# Patient Record
Sex: FEMALE | Race: WHITE | NOT HISPANIC OR LATINO | Employment: FULL TIME | URBAN - METROPOLITAN AREA
[De-identification: names, ages, dates, MRNs, and addresses within clinical notes are randomized per-mention and may not be internally consistent; named-entity substitution may affect disease eponyms.]

---

## 2017-01-26 ENCOUNTER — ANESTHESIA EVENT (OUTPATIENT)
Dept: SURGERY | Facility: CLINIC | Age: 37
End: 2017-01-26
Payer: COMMERCIAL

## 2017-01-27 ENCOUNTER — HOSPITAL ENCOUNTER (OUTPATIENT)
Facility: CLINIC | Age: 37
Discharge: HOME | End: 2017-01-27
Attending: OBSTETRICS & GYNECOLOGY | Admitting: OBSTETRICS & GYNECOLOGY
Payer: COMMERCIAL

## 2017-01-27 ENCOUNTER — ANESTHESIA (OUTPATIENT)
Dept: SURGERY | Facility: CLINIC | Age: 37
End: 2017-01-27
Payer: COMMERCIAL

## 2017-01-27 VITALS
BODY MASS INDEX: 31.02 KG/M2 | WEIGHT: 181.7 LBS | TEMPERATURE: 97.7 F | RESPIRATION RATE: 14 BRPM | OXYGEN SATURATION: 100 % | SYSTOLIC BLOOD PRESSURE: 112 MMHG | HEIGHT: 64 IN | DIASTOLIC BLOOD PRESSURE: 82 MMHG

## 2017-01-27 DIAGNOSIS — N84.0 ENDOMETRIAL POLYP: Primary | ICD-10-CM

## 2017-01-27 LAB — HCG SERPL QL: NEGATIVE

## 2017-01-27 PROCEDURE — 88305 TISSUE EXAM BY PATHOLOGIST: CPT | Performed by: OBSTETRICS & GYNECOLOGY

## 2017-01-27 PROCEDURE — 71000027 ZZH RECOVERY PHASE 2 EACH 15 MINS: Performed by: OBSTETRICS & GYNECOLOGY

## 2017-01-27 PROCEDURE — 25000125 ZZHC RX 250: Performed by: ANESTHESIOLOGY

## 2017-01-27 PROCEDURE — 40000170 ZZH STATISTIC PRE-PROCEDURE ASSESSMENT II: Performed by: OBSTETRICS & GYNECOLOGY

## 2017-01-27 PROCEDURE — 25800025 ZZH RX 258: Performed by: NURSE ANESTHETIST, CERTIFIED REGISTERED

## 2017-01-27 PROCEDURE — 36415 COLL VENOUS BLD VENIPUNCTURE: CPT | Performed by: OBSTETRICS & GYNECOLOGY

## 2017-01-27 PROCEDURE — 37000008 ZZH ANESTHESIA TECHNICAL FEE, 1ST 30 MIN: Performed by: OBSTETRICS & GYNECOLOGY

## 2017-01-27 PROCEDURE — 88305 TISSUE EXAM BY PATHOLOGIST: CPT | Mod: 26 | Performed by: OBSTETRICS & GYNECOLOGY

## 2017-01-27 PROCEDURE — 84703 CHORIONIC GONADOTROPIN ASSAY: CPT | Performed by: OBSTETRICS & GYNECOLOGY

## 2017-01-27 PROCEDURE — 71000012 ZZH RECOVERY PHASE 1 LEVEL 1 FIRST HR: Performed by: OBSTETRICS & GYNECOLOGY

## 2017-01-27 PROCEDURE — 36000058 ZZH SURGERY LEVEL 3 EA 15 ADDTL MIN: Performed by: OBSTETRICS & GYNECOLOGY

## 2017-01-27 PROCEDURE — 37000009 ZZH ANESTHESIA TECHNICAL FEE, EACH ADDTL 15 MIN: Performed by: OBSTETRICS & GYNECOLOGY

## 2017-01-27 PROCEDURE — 25000125 ZZHC RX 250: Performed by: OBSTETRICS & GYNECOLOGY

## 2017-01-27 PROCEDURE — 25000125 ZZHC RX 250: Performed by: NURSE ANESTHETIST, CERTIFIED REGISTERED

## 2017-01-27 PROCEDURE — 71000013 ZZH RECOVERY PHASE 1 LEVEL 1 EA ADDTL HR: Performed by: OBSTETRICS & GYNECOLOGY

## 2017-01-27 PROCEDURE — 36000056 ZZH SURGERY LEVEL 3 1ST 30 MIN: Performed by: OBSTETRICS & GYNECOLOGY

## 2017-01-27 PROCEDURE — 27210794 ZZH OR GENERAL SUPPLY STERILE: Performed by: OBSTETRICS & GYNECOLOGY

## 2017-01-27 RX ORDER — CEFAZOLIN SODIUM 2 G/100ML
2 INJECTION, SOLUTION INTRAVENOUS
Status: COMPLETED | OUTPATIENT
Start: 2017-01-27 | End: 2017-01-27

## 2017-01-27 RX ORDER — OXYCODONE HYDROCHLORIDE 5 MG/1
5-10 TABLET ORAL
Qty: 15 TABLET | Refills: 0 | Status: SHIPPED | OUTPATIENT
Start: 2017-01-27

## 2017-01-27 RX ORDER — ONDANSETRON 2 MG/ML
4 INJECTION INTRAMUSCULAR; INTRAVENOUS EVERY 30 MIN PRN
Status: DISCONTINUED | OUTPATIENT
Start: 2017-01-27 | End: 2017-01-27 | Stop reason: HOSPADM

## 2017-01-27 RX ORDER — KETOROLAC TROMETHAMINE 30 MG/ML
30 INJECTION, SOLUTION INTRAMUSCULAR; INTRAVENOUS EVERY 6 HOURS PRN
Status: DISCONTINUED | OUTPATIENT
Start: 2017-01-27 | End: 2017-01-27 | Stop reason: HOSPADM

## 2017-01-27 RX ORDER — CEFAZOLIN SODIUM 1 G/3ML
1 INJECTION, POWDER, FOR SOLUTION INTRAMUSCULAR; INTRAVENOUS SEE ADMIN INSTRUCTIONS
Status: DISCONTINUED | OUTPATIENT
Start: 2017-01-27 | End: 2017-01-27 | Stop reason: HOSPADM

## 2017-01-27 RX ORDER — SODIUM CHLORIDE, SODIUM LACTATE, POTASSIUM CHLORIDE, CALCIUM CHLORIDE 600; 310; 30; 20 MG/100ML; MG/100ML; MG/100ML; MG/100ML
1000 INJECTION, SOLUTION INTRAVENOUS CONTINUOUS
Status: DISCONTINUED | OUTPATIENT
Start: 2017-01-27 | End: 2017-01-27 | Stop reason: HOSPADM

## 2017-01-27 RX ORDER — PHYSOSTIGMINE SALICYLATE 1 MG/ML
1.2 INJECTION INTRAVENOUS
Status: DISCONTINUED | OUTPATIENT
Start: 2017-01-27 | End: 2017-01-27 | Stop reason: HOSPADM

## 2017-01-27 RX ORDER — ONDANSETRON 4 MG/1
4 TABLET, ORALLY DISINTEGRATING ORAL EVERY 30 MIN PRN
Status: DISCONTINUED | OUTPATIENT
Start: 2017-01-27 | End: 2017-01-27 | Stop reason: HOSPADM

## 2017-01-27 RX ORDER — IBUPROFEN 600 MG/1
600 TABLET, FILM COATED ORAL
Status: DISCONTINUED | OUTPATIENT
Start: 2017-01-27 | End: 2017-01-27 | Stop reason: HOSPADM

## 2017-01-27 RX ORDER — FENTANYL CITRATE 50 UG/ML
25-50 INJECTION, SOLUTION INTRAMUSCULAR; INTRAVENOUS
Status: DISCONTINUED | OUTPATIENT
Start: 2017-01-27 | End: 2017-01-27 | Stop reason: HOSPADM

## 2017-01-27 RX ORDER — SODIUM CHLORIDE, SODIUM LACTATE, POTASSIUM CHLORIDE, CALCIUM CHLORIDE 600; 310; 30; 20 MG/100ML; MG/100ML; MG/100ML; MG/100ML
INJECTION, SOLUTION INTRAVENOUS CONTINUOUS PRN
Status: DISCONTINUED | OUTPATIENT
Start: 2017-01-27 | End: 2017-01-27

## 2017-01-27 RX ORDER — LIDOCAINE HYDROCHLORIDE 20 MG/ML
INJECTION, SOLUTION INFILTRATION; PERINEURAL PRN
Status: DISCONTINUED | OUTPATIENT
Start: 2017-01-27 | End: 2017-01-27

## 2017-01-27 RX ORDER — PROPOFOL 10 MG/ML
INJECTION, EMULSION INTRAVENOUS CONTINUOUS PRN
Status: DISCONTINUED | OUTPATIENT
Start: 2017-01-27 | End: 2017-01-27

## 2017-01-27 RX ORDER — EPINEPHRINE 0.15 MG/.3ML
0.15 INJECTION INTRAMUSCULAR PRN
COMMUNITY

## 2017-01-27 RX ORDER — DEXAMETHASONE SODIUM PHOSPHATE 4 MG/ML
INJECTION, SOLUTION INTRA-ARTICULAR; INTRALESIONAL; INTRAMUSCULAR; INTRAVENOUS; SOFT TISSUE PRN
Status: DISCONTINUED | OUTPATIENT
Start: 2017-01-27 | End: 2017-01-27

## 2017-01-27 RX ORDER — FENTANYL CITRATE 50 UG/ML
25-50 INJECTION, SOLUTION INTRAMUSCULAR; INTRAVENOUS EVERY 5 MIN PRN
Status: DISCONTINUED | OUTPATIENT
Start: 2017-01-27 | End: 2017-01-27 | Stop reason: HOSPADM

## 2017-01-27 RX ORDER — NALOXONE HYDROCHLORIDE 0.4 MG/ML
.1-.4 INJECTION, SOLUTION INTRAMUSCULAR; INTRAVENOUS; SUBCUTANEOUS
Status: DISCONTINUED | OUTPATIENT
Start: 2017-01-27 | End: 2017-01-27 | Stop reason: HOSPADM

## 2017-01-27 RX ORDER — MEPERIDINE HYDROCHLORIDE 25 MG/ML
12.5 INJECTION INTRAMUSCULAR; INTRAVENOUS; SUBCUTANEOUS
Status: DISCONTINUED | OUTPATIENT
Start: 2017-01-27 | End: 2017-01-27 | Stop reason: HOSPADM

## 2017-01-27 RX ORDER — ONDANSETRON 2 MG/ML
INJECTION INTRAMUSCULAR; INTRAVENOUS PRN
Status: DISCONTINUED | OUTPATIENT
Start: 2017-01-27 | End: 2017-01-27

## 2017-01-27 RX ORDER — HYDROMORPHONE HYDROCHLORIDE 1 MG/ML
.3-.5 INJECTION, SOLUTION INTRAMUSCULAR; INTRAVENOUS; SUBCUTANEOUS EVERY 10 MIN PRN
Status: DISCONTINUED | OUTPATIENT
Start: 2017-01-27 | End: 2017-01-27 | Stop reason: HOSPADM

## 2017-01-27 RX ORDER — OXYCODONE HYDROCHLORIDE 5 MG/1
5-10 TABLET ORAL
Status: DISCONTINUED | OUTPATIENT
Start: 2017-01-27 | End: 2017-01-27 | Stop reason: HOSPADM

## 2017-01-27 RX ORDER — FENTANYL CITRATE 50 UG/ML
INJECTION, SOLUTION INTRAMUSCULAR; INTRAVENOUS PRN
Status: DISCONTINUED | OUTPATIENT
Start: 2017-01-27 | End: 2017-01-27

## 2017-01-27 RX ORDER — PROPOFOL 10 MG/ML
INJECTION, EMULSION INTRAVENOUS PRN
Status: DISCONTINUED | OUTPATIENT
Start: 2017-01-27 | End: 2017-01-27

## 2017-01-27 RX ORDER — HYDRALAZINE HYDROCHLORIDE 20 MG/ML
2.5-5 INJECTION INTRAMUSCULAR; INTRAVENOUS EVERY 10 MIN PRN
Status: DISCONTINUED | OUTPATIENT
Start: 2017-01-27 | End: 2017-01-27 | Stop reason: HOSPADM

## 2017-01-27 RX ADMIN — ONDANSETRON 4 MG: 2 INJECTION INTRAMUSCULAR; INTRAVENOUS at 07:50

## 2017-01-27 RX ADMIN — LIDOCAINE HYDROCHLORIDE 60 MG: 20 INJECTION, SOLUTION INFILTRATION; PERINEURAL at 07:29

## 2017-01-27 RX ADMIN — DEXAMETHASONE SODIUM PHOSPHATE 4 MG: 4 INJECTION, SOLUTION INTRAMUSCULAR; INTRAVENOUS at 07:38

## 2017-01-27 RX ADMIN — SODIUM CHLORIDE, POTASSIUM CHLORIDE, SODIUM LACTATE AND CALCIUM CHLORIDE: 600; 310; 30; 20 INJECTION, SOLUTION INTRAVENOUS at 07:27

## 2017-01-27 RX ADMIN — PROPOFOL 175 MCG/KG/MIN: 10 INJECTION, EMULSION INTRAVENOUS at 07:30

## 2017-01-27 RX ADMIN — CEFAZOLIN SODIUM 2 G: 2 INJECTION, SOLUTION INTRAVENOUS at 07:32

## 2017-01-27 RX ADMIN — PROPOFOL 200 MG: 10 INJECTION, EMULSION INTRAVENOUS at 07:29

## 2017-01-27 RX ADMIN — MIDAZOLAM HYDROCHLORIDE 2 MG: 1 INJECTION, SOLUTION INTRAMUSCULAR; INTRAVENOUS at 07:29

## 2017-01-27 RX ADMIN — KETOROLAC TROMETHAMINE 30 MG: 30 INJECTION, SOLUTION INTRAMUSCULAR at 08:30

## 2017-01-27 RX ADMIN — FENTANYL CITRATE 50 MCG: 50 INJECTION, SOLUTION INTRAMUSCULAR; INTRAVENOUS at 07:29

## 2017-01-27 NOTE — ANESTHESIA CARE TRANSFER NOTE
Patient: Calista Nelson    COMBINED DILATION AND CURETTAGE, OPERATIVE HYSTEROSCOPY WITH MORCELLATOR (N/A Vagina)  Additional InformationProcedure(s):  ENDOMETERIAL POLYPS, RESECTION OF: HYSTEROSCOPY,  VISUAL CURRETAGE - Wound Class: II-Clean Contaminated    Diagnosis: ENDOMETRIAL POLYP   Diagnosis Additional Information: No value filed.    Anesthesia Type:   General, LMA     Note:  Airway :Face Mask  Patient transferred to:PACU  Comments: Patient with good spontaneous respirations, tidal volumes 300-500cc. Patient awake, follows commands, LMA removed. Dentition unchanged from pReop IV patent.. Patient transported to recovery with oxygen. Report given to RN, care transferred, questions answered. Patient glasses handed off to Julisa PACU nurse.  Vitals in PACU:  /65  HR 85  RR 16  Sats 100%  Temp 97.3      Vitals: (Last set prior to Anesthesia Care Transfer)              Electronically Signed By: MERT Frazier CRNA  January 27, 2017  8:05 AM

## 2017-01-27 NOTE — IP AVS SNAPSHOT
[facility] Same Day Surgery    [address]    [place] [address]    Phone:  [phone]    Fax:  [phone]                                       After Visit Summary   1/27/2017    Calista Nelson    MRN: [id number]           After Visit Summary Signature Page     I have received my discharge instructions, and my questions have been answered. I have discussed any challenges I see with this plan with the nurse or doctor.    ..........................................................................................................................................  Patient/Patient Representative Signature      ..........................................................................................................................................  Patient Representative Print Name and Relationship to Patient    ..................................................               ................................................  Date                                            Time    ..........................................................................................................................................  Reviewed by Signature/Title    ...................................................              ..............................................  Date                                                            Time

## 2017-01-27 NOTE — IP AVS SNAPSHOT
MRN:[id number]                      After Visit Summary   1/27/2017    Calista Nelson    MRN: [id number]           Thank you!     Thank you for choosing [facility] for your care. Our goal is always to provide you with excellent care. Hearing back from our patients is one way we can continue to improve our services. Please take a few minutes to complete the written survey that you may receive in the mail after you visit with us. Thank you!        Patient Information     Date Of Birth          1980        About your hospital stay     You were admitted on:  January 27, 2017 You last received care in the:  [facility] Same Day Surgery    You were discharged on:  January 27, 2017       Who to Call     For medical emergencies, please call 911.  For non-urgent questions about your medical care, please call your primary care provider or clinic, [phone]  For questions related to your surgery, please call your surgery clinic        Attending Provider     Provider    Alexandria Jefferson MD       Primary Care Provider Office Phone # Fax #    Ignacio Wen [phone]       [facility] [address]  [address]  [facility] [address]        After Care Instructions     Discharge Instructions       Patient to arrange follow up appointment in 2  weeks            Discharge Instructions       Pelvic Rest. No tampons, douching or intercourse for  1  weeks.                  Further instructions from your care team       Same Day Surgery Discharge Instructions for  Sedation and General Anesthesia       It's not unusual to feel dizzy, light-headed or faint for up to 24 hours after surgery or while taking pain medication.  If you have these symptoms: sit for a few minutes before standing and have someone assist you when you get up to walk or use the bathroom.      You should rest and relax for the next 24 hours. We recommend you make arrangements to have an adult stay with you for  at least 24 hours after your discharge.  Avoid hazardous and strenuous activity.      DO NOT DRIVE any vehicle or operate mechanical equipment for 24 hours following the end of your surgery.  Even though you may feel normal, your reactions may be affected by the medication you have received.      Do not drink alcoholic beverages for 24 hours following surgery.       Slowly progress to your regular diet as you feel able. It's not unusual to feel nauseated and/or vomit after receiving anesthesia.  If you develop these symptoms, drink clear liquids (apple juice, ginger ale, broth, 7-up, etc. ) until you feel better.  If your nausea and vomiting persists for 24 hours, please notify your surgeon.        All narcotic pain medications, along with inactivity and anesthesia, can cause constipation. Drinking plenty of liquids and increasing fiber intake will help.      For any questions of a medical nature, call your surgeon.      Do not make important decisions for 24 hours.      If you had general anesthesia, you may have a sore throat for a couple of days related to the breathing tube used during surgery.  You may use Cepacol lozenges to help with this discomfort.  If it worsens or if you develop a fever, contact your surgeon.       If you feel your pain is not well managed with the pain medications prescribed by your surgeon, please contact your surgeon's office to let them know so they can address your concerns.     [facility]  D&C   Discharge Instructions    ACTIVITY:  You may restart normal activities as your abdominal discomfort disappears.  It is certainly permissible to climb stairs, shower, and do ordinary, quiet activities.  More vigorous activities such as sports, intercourse and work may be resumed in 1 week.    OFFICE VISIT:  Please call a day or two after your surgery to make an appointment in approximately 2 weeks to discuss the results of your surgery and have your check-up.    VAGINAL  DISCHARGE:  You may have some bloody vaginal discharge for as long as one week.  Ordinary tampons may be used after 3-4 days.  Avoid super absorbent tampons.    TEMPERATURE:  If you develop a temperature elevation of 101 F or higher, please call our office immediately.    RESTRICTIONS:  Due to the effects of general anesthesia, please do not drive a car, drink alcoholic beverages, nor operate complex machinery in the first 24 hours following surgery.    PLEASE FEEL FREE TO CALL OUR OFFICE IF ANY QUESTIONS OR PROBLEMS ARISE.    [facility]    Nirmal Goins M.D.  Kishan Moya M.D.   Kareem Briggs M.D.  JANEL Sam M.D.   Alexandria Jefferson M.D.  Danay Jean M.D.  DEIDRA Yo M.D. Kimberly Naruko-Stewart, M.D.  _________________________________________________________________________________                   [facility]              [address] [place] [address]  [address]            [facility] [address]  [place] [address]            [phone] (Telephone)                                               [phone] (Telephone)            [phone] (Fax)  [phone] (Fax)            [facility]    **If you have questions or concerns about your procedure,   Call Dr. Jefferson at [phone]**      While you were at the hospital today you received Toradol, an antiinflammatory medication similar to Ibuprofen.  You should not take other antiinflammatory medication, such as Ibuprofen, Motrin, Advil, Aleve, Naprosyn, etc, until 2:30PM.             Pending Results     No orders found from 1/26/2017 to 1/28/2017.            Admission Information        Provider Department Dept Phone    1/27/2017 Alexandria Jefferson MD [facility] [phone]      Your Vitals Were     Blood Pressure  "Temperature Respirations    115/73 mmHg 97.3  F (36.3  C) (Temporal) 14    Height Weight BMI (Body Mass Index)    1.626 m (5' 4\") 82.419 kg (181 lb 11.2 oz) 31.17 kg/m2    Pulse Oximetry Last Period       100% 2017       [facility] Information     [facility] lets you send messages to your doctor, view your test results, renew your prescriptions, schedule appointments and more. To sign up, go to [email or web address] . Click on \"Log in\" on the left side of the screen, which will take you to the Welcome page. Then click on \"Sign up Now\" on the right side of the page.     You will be asked to enter the access code listed below, as well as some personal information. Please follow the directions to create your username and password.     Your access code is: [email or web address]  Expires: 2017  8:14 AM     Your access code will  in 90 days. If you need help or a new code, please call your [facility] clinic or [phone].        Care EveryWhere ID     This is your Care EveryWhere ID. This could be used by other organizations to access your [facility] medical records  [id number]           Review of your medicines      START taking        Dose / Directions    oxyCODONE 5 MG IR tablet   Commonly known as:  ROXICODONE   Used for:  Endometrial polyp        Dose:  5-10 mg   Take 1-2 tablets (5-10 mg) by mouth every 3 hours as needed for pain or other (Moderate to Severe)   Quantity:  15 tablet   Refills:  0         CONTINUE these medicines which have NOT CHANGED        Dose / Directions    EPINEPHrine 0.15 MG/0.3ML injection   Commonly known as:  EPIPEN JR        Dose:  0.15 mg   Inject 0.15 mg into the muscle as needed for anaphylaxis   Refills:  0       KLONOPIN PO        Dose:  1 mg   Take 1 mg by mouth daily   Refills:  0            Where to get your medicines      Some of these will need a paper prescription and others can be bought over the counter. Ask your nurse if you have questions.     Bring a paper prescription " for each of these medications    - oxyCODONE 5 MG IR tablet             Protect others around you: Learn how to safely use, store and throw away your medicines at www.disposemymeds.org.             Medication List: This is a list of all your medications and when to take them. Check marks below indicate your daily home schedule. Keep this list as a reference.      Medications           Morning Afternoon Evening Bedtime As Needed    EPINEPHrine 0.15 MG/0.3ML injection   Commonly known as:  EPIPEN JR   Inject 0.15 mg into the muscle as needed for anaphylaxis                                KLONOPIN PO   Take 1 mg by mouth daily                                oxyCODONE 5 MG IR tablet   Commonly known as:  ROXICODONE   Take 1-2 tablets (5-10 mg) by mouth every 3 hours as needed for pain or other (Moderate to Severe)

## 2017-01-27 NOTE — ANESTHESIA PREPROCEDURE EVALUATION
Anesthesia Evaluation     . Pt has had prior anesthetic.       ROS/MED HX    ENT/Pulmonary:  - neg pulmonary ROS     Neurologic:       Cardiovascular:  - neg cardiovascular ROS       METS/Exercise Tolerance:     Hematologic:         Musculoskeletal:         GI/Hepatic:  - neg GI/hepatic ROS       Renal/Genitourinary:         Endo:     (+) Obesity, .      Psychiatric:     (+) psychiatric history anxiety (panic disorder, bulemia)      Infectious Disease:         Malignancy:         Other:               Physical Exam  Normal systems: cardiovascular, pulmonary and dental    Airway   Mallampati: I  TM distance: >3 FB  Neck ROM: full    Dental     Cardiovascular   Rhythm and rate: regular and normal      Pulmonary    breath sounds clear to auscultation                    Anesthesia Plan      History & Physical Review  History and physical reviewed and following examination; no interval change.    ASA Status:  2 .    NPO Status:  > 8 hours    Plan for General and LMA with Propofol induction. Maintenance will be TIVA.    PONV prophylaxis:  Ondansetron (or other 5HT-3) and Dexamethasone or Solumedrol       Postoperative Care  Postoperative pain management:  IV analgesics and Oral pain medications.      Consents  Anesthetic plan, risks, benefits and alternatives discussed with:  Patient..                          .

## 2017-01-27 NOTE — BRIEF OP NOTE
[facility] Brief Operative Note    Pre-operative diagnosis: ENDOMETRIAL POLYPS   Post-operative diagnosis Same   Procedure: Procedure(s):  OPERATIVE HYSTEROSCOPY RESECTION OF ENDOMETRIAL POLYP ; DUEÑAS AND NEPHEW MORCELLATOR ; POSSIBLE DILATION CURETTAGE.  - Wound Class: II-Clean Contaminated   Surgeon(s): Surgeon(s) and Role:     * Alexandria Jefferson MD - Primary   Estimated blood loss: 10 cc   Specimens: EM polyps   Findings: 2 EM polyps, otherwise normal EM cavity

## 2017-01-27 NOTE — ANESTHESIA POSTPROCEDURE EVALUATION
Patient: Calista Nelson    COMBINED DILATION AND CURETTAGE, OPERATIVE HYSTEROSCOPY WITH MORCELLATOR (N/A Vagina)  Additional InformationProcedure(s):  ENDOMETERIAL POLYPS, RESECTION OF: HYSTEROSCOPY,  VISUAL CURRETAGE - Wound Class: II-Clean Contaminated    Diagnosis:ENDOMETRIAL POLYP   Diagnosis Additional Information: No value filed.    Anesthesia Type:  General, LMA    Note:  Anesthesia Post Evaluation    Patient location during evaluation: PACU  Patient participation: Able to fully participate in evaluation  Level of consciousness: awake  Pain management: adequate  Airway patency: patent  Cardiovascular status: acceptable  Respiratory status: acceptable  Hydration status: acceptable  PONV: none     Anesthetic complications: None          Last vitals:  Filed Vitals:    01/27/17 0656 01/27/17 0803   BP: 126/71 116/65   Temp: 36.1  C (97  F) 36.3  C (97.3  F)   Resp: 16 20   SpO2: 98% 100%       Electronically Signed By: Abdirashid Schmidt MD  January 27, 2017  8:12 AM

## 2017-01-27 NOTE — OR NURSING
Glasses returned to pt.  PNDS met, po per I&O sheet. Pt dressed, up in recliner and transported to Phase 2.

## 2017-01-27 NOTE — OP NOTE
DATE OF SURGERY:  01/27/2017.      PREOPERATIVE DIAGNOSIS:  Endometrial polyps.      POSTOPERATIVE DIAGNOSIS:  Endometrial polyps.      PROCEDURES:  Operative hysteroscopy with resection of endometrial polyps and visual endometrial curettage.      ANESTHESIA:  General.      SURGEON:  Alexandria Jefferson MD      ESTIMATED BLOOD LOSS:  10 mL.      SPECIMENS:  Endometrial polyps.      OPERATIVE INDICATION:  Calista Nelson is a 36-year-old female who underwent a pelvic ultrasound in the office due to pelvic pain and was found to have a fibroid and possible endometrial polyps.  A saline infusion sonogram was then done which revealed 3 probable endometrial polyps and 1 possible submucosal fibroid.  The fibroid appeared that it was to be abutting the endometrial cavity, but was not clearly submucosal.  After discussing the options, the decision was made to proceed with surgical management.      OPERATIVE FINDINGS:  Hysteroscopy revealed there to be 2 endometrial polyps, 1 arising from the right fundus of the uterus inferior to the right tubal ostia and this was approximately 8 mm in size.  The other fibroid arose from the left midportion of the uterus and this was approximately 1 cm in size.  Once these were removed, the endometrial cavity appeared normal and there was no evidence of any submucosal fibroids.  The tubal ostia appeared normal as well.  The cervix descended to the level of the introitus with downward pressure using the tenaculum.  There was a grade 1 cystocele and no significant rectocele.      OPERATIVE TECHNIQUE:  After informed consent, patient was taken to the operating room where she was given a general anesthetic.  She was placed in the dorsal lithotomy position and prepped and draped in the usual sterile fashion.  A timeout was performed.  A speculum was inserted in the vagina.  Cervix was grasped with a single-tooth tenaculum.  Due to initial difficulty with dilation and finding the proper tract of the  internal os, the decision was made to enter the uterus with the 5.6 mm Smith & Nephew hysteroscope.  Using the hysteroscope, the cervical canal was easily visualized and the endometrial cavity was easily entered, and using saline as a distending medium with the hysteroscopy, the findings were as noted above.  The Smith & Nephew incisor blade was then placed through the operating channel of the hysteroscope and both endometrial polyps were removed using the Smith & Nephew morcellator without difficulty.  Once both polyps were removed, a superficial layer of tissue was removed using the morcellator to perform the visual curettage.  Once this was complete, the endometrial cavity appeared completely normal.  There was no evidence of submucosal fibroids.  The hysteroscope was then removed.  The fluid deficit from the hysteroscopy was approximately 50 mL of saline.  The tenaculum was then removed from the cervix.  There was no bleeding noted.  The speculum was removed.  The patient tolerated the procedure well.  Counts were correct x2.  She was transferred to the recovery room in stable condition.         ALEXANDRIA JEFFERSON MD             D: 2017 08:03   T: 2017 13:21   MT: TS      Name:     MANI RANKIN   MRN:      [id number]        Account:        [id number]   :      1980           Procedure Date: 2017      Document: [id number]       cc: [facility] Jefferson MD

## 2017-01-27 NOTE — DISCHARGE INSTRUCTIONS
Same Day Surgery Discharge Instructions for  Sedation and General Anesthesia       It's not unusual to feel dizzy, light-headed or faint for up to 24 hours after surgery or while taking pain medication.  If you have these symptoms: sit for a few minutes before standing and have someone assist you when you get up to walk or use the bathroom.      You should rest and relax for the next 24 hours. We recommend you make arrangements to have an adult stay with you for at least 24 hours after your discharge.  Avoid hazardous and strenuous activity.      DO NOT DRIVE any vehicle or operate mechanical equipment for 24 hours following the end of your surgery.  Even though you may feel normal, your reactions may be affected by the medication you have received.      Do not drink alcoholic beverages for 24 hours following surgery.       Slowly progress to your regular diet as you feel able. It's not unusual to feel nauseated and/or vomit after receiving anesthesia.  If you develop these symptoms, drink clear liquids (apple juice, ginger ale, broth, 7-up, etc. ) until you feel better.  If your nausea and vomiting persists for 24 hours, please notify your surgeon.        All narcotic pain medications, along with inactivity and anesthesia, can cause constipation. Drinking plenty of liquids and increasing fiber intake will help.      For any questions of a medical nature, call your surgeon.      Do not make important decisions for 24 hours.      If you had general anesthesia, you may have a sore throat for a couple of days related to the breathing tube used during surgery.  You may use Cepacol lozenges to help with this discomfort.  If it worsens or if you develop a fever, contact your surgeon.       If you feel your pain is not well managed with the pain medications prescribed by your surgeon, please contact your surgeon's office to let them know so they can address your concerns.     [facility]  D&C   Discharge  Instructions    ACTIVITY:  You may restart normal activities as your abdominal discomfort disappears.  It is certainly permissible to climb stairs, shower, and do ordinary, quiet activities.  More vigorous activities such as sports, intercourse and work may be resumed in 1 week.    OFFICE VISIT:  Please call a day or two after your surgery to make an appointment in approximately 2 weeks to discuss the results of your surgery and have your check-up.    VAGINAL DISCHARGE:  You may have some bloody vaginal discharge for as long as one week.  Ordinary tampons may be used after 3-4 days.  Avoid super absorbent tampons.    TEMPERATURE:  If you develop a temperature elevation of 101 F or higher, please call our office immediately.    RESTRICTIONS:  Due to the effects of general anesthesia, please do not drive a car, drink alcoholic beverages, nor operate complex machinery in the first 24 hours following surgery.    PLEASE FEEL FREE TO CALL OUR OFFICE IF ANY QUESTIONS OR PROBLEMS ARISE.    [facility]    Nirmal Goins M.D.  Kishan Moya M.D.   Kareem Briggs M.D.  JANEL Sam M.D.   Alexand[facility] Jefferson M.D.  Danay Jean M.D.  Edna Nelson D.O.  Anushka Davila M.D.   Marsha Dalal M.D.  _________________________________________________________________________________                   [facility]              [address] [facility] [address]  [address]            [facility] [address]  [place] [address]            [phone] (Telephone)                                               [phone] (Telephone)            [phone] (Fax)  [phone] (Fax)            formerly [facility]    **If you have questions or concerns about your procedure,   Call Dr. Jefferson at [phone]**      While you were at  the hospital today you received Toradol, an antiinflammatory medication similar to Ibuprofen.  You should not take other antiinflammatory medication, such as Ibuprofen, Motrin, Advil, Aleve, Naprosyn, etc, until 2:30PM.

## 2017-01-30 LAB — COPATH REPORT: NORMAL

## 2022-05-30 PROCEDURE — 96375 TX/PRO/DX INJ NEW DRUG ADDON: CPT

## 2022-05-30 PROCEDURE — 99284 EMERGENCY DEPT VISIT MOD MDM: CPT | Mod: 25

## 2022-05-30 PROCEDURE — 96374 THER/PROPH/DIAG INJ IV PUSH: CPT

## 2022-05-31 ENCOUNTER — HOSPITAL ENCOUNTER (EMERGENCY)
Facility: CLINIC | Age: 42
Discharge: HOME | End: 2022-05-31
Attending: EMERGENCY MEDICINE | Admitting: EMERGENCY MEDICINE
Payer: COMMERCIAL

## 2022-05-31 VITALS
HEART RATE: 81 BPM | TEMPERATURE: 98.3 F | BODY MASS INDEX: 32.44 KG/M2 | HEIGHT: 64 IN | OXYGEN SATURATION: 97 % | DIASTOLIC BLOOD PRESSURE: 81 MMHG | RESPIRATION RATE: 14 BRPM | WEIGHT: 190 LBS | SYSTOLIC BLOOD PRESSURE: 120 MMHG

## 2022-05-31 DIAGNOSIS — R51.9 FRONTAL HEADACHE: ICD-10-CM

## 2022-05-31 DIAGNOSIS — J01.10 SUBACUTE FRONTAL SINUSITIS: ICD-10-CM

## 2022-05-31 LAB
ANION GAP SERPL CALCULATED.3IONS-SCNC: 5 MMOL/L (ref 3–14)
BASOPHILS # BLD AUTO: 0.1 10E3/UL (ref 0–0.2)
BASOPHILS NFR BLD AUTO: 1 %
BUN SERPL-MCNC: 15 MG/DL (ref 7–30)
CALCIUM SERPL-MCNC: 8.7 MG/DL (ref 8.5–10.1)
CHLORIDE BLD-SCNC: 106 MMOL/L (ref 94–109)
CO2 SERPL-SCNC: 27 MMOL/L (ref 20–32)
CREAT SERPL-MCNC: 0.73 MG/DL (ref 0.52–1.04)
EOSINOPHIL # BLD AUTO: 0.6 10E3/UL (ref 0–0.7)
EOSINOPHIL NFR BLD AUTO: 6 %
ERYTHROCYTE [DISTWIDTH] IN BLOOD BY AUTOMATED COUNT: 14.5 % (ref 10–15)
GFR SERPL CREATININE-BSD FRML MDRD: >90 ML/MIN/1.73M2
GLUCOSE BLD-MCNC: 98 MG/DL (ref 70–99)
HCT VFR BLD AUTO: 38.3 % (ref 35–47)
HGB BLD-MCNC: 12.3 G/DL (ref 11.7–15.7)
IMM GRANULOCYTES # BLD: 0 10E3/UL
IMM GRANULOCYTES NFR BLD: 0 %
LYMPHOCYTES # BLD AUTO: 2.4 10E3/UL (ref 0.8–5.3)
LYMPHOCYTES NFR BLD AUTO: 26 %
MCH RBC QN AUTO: 26.6 PG (ref 26.5–33)
MCHC RBC AUTO-ENTMCNC: 32.1 G/DL (ref 31.5–36.5)
MCV RBC AUTO: 83 FL (ref 78–100)
MONOCYTES # BLD AUTO: 0.7 10E3/UL (ref 0–1.3)
MONOCYTES NFR BLD AUTO: 7 %
NEUTROPHILS # BLD AUTO: 5.8 10E3/UL (ref 1.6–8.3)
NEUTROPHILS NFR BLD AUTO: 60 %
NRBC # BLD AUTO: 0 10E3/UL
NRBC BLD AUTO-RTO: 0 /100
PLATELET # BLD AUTO: 306 10E3/UL (ref 150–450)
POTASSIUM BLD-SCNC: 4.2 MMOL/L (ref 3.4–5.3)
RBC # BLD AUTO: 4.62 10E6/UL (ref 3.8–5.2)
SODIUM SERPL-SCNC: 138 MMOL/L (ref 133–144)
WBC # BLD AUTO: 9.6 10E3/UL (ref 4–11)

## 2022-05-31 PROCEDURE — 80048 BASIC METABOLIC PNL TOTAL CA: CPT | Performed by: EMERGENCY MEDICINE

## 2022-05-31 PROCEDURE — 36415 COLL VENOUS BLD VENIPUNCTURE: CPT | Performed by: EMERGENCY MEDICINE

## 2022-05-31 PROCEDURE — 250N000011 HC RX IP 250 OP 636: Performed by: EMERGENCY MEDICINE

## 2022-05-31 PROCEDURE — 85025 COMPLETE CBC W/AUTO DIFF WBC: CPT | Performed by: EMERGENCY MEDICINE

## 2022-05-31 RX ORDER — DIPHENHYDRAMINE HYDROCHLORIDE 50 MG/ML
25 INJECTION INTRAMUSCULAR; INTRAVENOUS ONCE
Status: COMPLETED | OUTPATIENT
Start: 2022-05-31 | End: 2022-05-31

## 2022-05-31 RX ORDER — DEXAMETHASONE SODIUM PHOSPHATE 10 MG/ML
10 INJECTION, SOLUTION INTRAMUSCULAR; INTRAVENOUS ONCE
Status: COMPLETED | OUTPATIENT
Start: 2022-05-31 | End: 2022-05-31

## 2022-05-31 RX ORDER — METOCLOPRAMIDE 10 MG/1
10 TABLET ORAL 4 TIMES DAILY PRN
Qty: 10 TABLET | Refills: 0 | Status: SHIPPED | OUTPATIENT
Start: 2022-05-31

## 2022-05-31 RX ORDER — METOCLOPRAMIDE HYDROCHLORIDE 5 MG/ML
5 INJECTION INTRAMUSCULAR; INTRAVENOUS ONCE
Status: COMPLETED | OUTPATIENT
Start: 2022-05-31 | End: 2022-05-31

## 2022-05-31 RX ADMIN — METOCLOPRAMIDE 5 MG: 5 INJECTION, SOLUTION INTRAMUSCULAR; INTRAVENOUS at 01:01

## 2022-05-31 RX ADMIN — DEXAMETHASONE SODIUM PHOSPHATE 10 MG: 10 INJECTION, SOLUTION INTRAMUSCULAR; INTRAVENOUS at 01:04

## 2022-05-31 RX ADMIN — DIPHENHYDRAMINE HYDROCHLORIDE 25 MG: 50 INJECTION, SOLUTION INTRAMUSCULAR; INTRAVENOUS at 00:58

## 2022-05-31 NOTE — DISCHARGE INSTRUCTIONS
Prescription strength dosing instructions:  - 600mg ibuprofen (Advil, Motrin) every 6 hours as needed for fever/pain/inflammation.  Naprosyn (Naproxen, Aleve) works similarly and can be used instead, if preferred.  - 650mg acetaminophen (tylenol) every 4-6 hours or 1000mg every 6-8 hours (maximum of 3000mg in 24 hours).  Acetaminophen is often in combination over the counter and prescription pain medications.  Be sure to include this in daily total.    These medications work differently and can be used in combination.      Discharge Instructions  Headache    You were seen today for a headache. Headaches may be caused by many different things such as muscle tension, sinus inflammation, anxiety and stress, having too little sleep, too much alcohol, some medical conditions or injury. You may have a migraine, which is caused by changes in the blood vessels in your head.  At this time your provider does not find that your headache is a sign of anything dangerous or life-threatening.  However, sometimes the signs of serious illness do not show up right away.      Generally, every Emergency Department visit should have a follow-up clinic visit with either a primary or a specialty clinic/provider. Please follow-up as instructed by your emergency provider today.    Return to the Emergency Department if:  You get a new fever of 100.4 F or higher.  Your headache gets much worse.  You get a stiff neck with your headache.  You get a new headache that is significantly different or worse than headaches you have had before.  You are vomiting (throwing up) and cannot keep food or water down.  You have blurry or double vision or other problems with your eyes.  You have a new weakness on one side of your body.  You have difficulty with balance which is new.  You or your family thinks you are confused.  You have a seizure.    What can I do to help myself?  Pain medications - You may take a pain medication such as Tylenol   (acetaminophen), Advil , Motrin  (ibuprofen) or Aleve  (naproxen).  Take a pain reliever as soon as you notice symptoms.  Starting medications as soon as you start to have symptoms may lessen the amount of pain you have.  Relaxing in a quiet, dark room may help.  Get enough sleep and eat meals regularly.  You may need to watch for certain foods or other things which may trigger your headaches.  Keeping a journal of your headaches and possible triggers may help you and your primary provider to identify things which you should avoid which may be causing your headaches.  If you were given a prescription for medicine here today, be sure to read all of the information (including the package insert) that comes with your prescription.  This will include important information about the medicine, its side effects, and any warnings that you need to know about.  The pharmacist who fills the prescription can provide more information and answer questions you may have about the medicine.  If you have questions or concerns that the pharmacist cannot address, please call or return to the Emergency Department.   Remember that you can always come back to the Emergency Department if you are not able to see your regular provider in the amount of time listed above, if you get any new symptoms, or if there is anything that worries you.

## 2022-05-31 NOTE — ED TRIAGE NOTES
Patient complaints of sharp, shooting pains in head for 3 days.      Triage Assessment     Row Name 05/30/22 8647       Triage Assessment (Adult)    Airway WDL WDL       Respiratory WDL    Respiratory WDL WDL       Skin Circulation/Temperature WDL    Skin Circulation/Temperature WDL WDL       Cardiac WDL    Cardiac WDL WDL       Peripheral/Neurovascular WDL    Peripheral Neurovascular WDL WDL       Cognitive/Neuro/Behavioral WDL    Cognitive/Neuro/Behavioral WDL X    Level of Consciousness alert    Arousal Level opens eyes spontaneously    Orientation oriented x 4    Speech clear;spontaneous;logical    Mood/Behavior cooperative;calm

## 2022-05-31 NOTE — ED PROVIDER NOTES
"  History   Chief Complaint:  Headache     HPI   Calista Nelson is a 41 year old female with history of remote migraines who presents with a new type of headache that has intermittent worsening over the past 3 days.  She has a persistent discomfort in her forehead.  She has episodes of sharp pains behind her eyes that comes several times a day.  She has nausea when the pain is most significant.  There has been no vomiting or focal neurologic deficits.  She does not have a fever and has no problems moving her neck.  She does not have light sensitivity which she remembers having had with her past migraines.  She last took ibuprofen at 10 PM which helped some with a frontal headache but not the shooting pains.  She recently had an upper respiratory infection and has a residual cough from that.  She no longer has a sinus drainage that she had at the time.  Denies chance of pregnancy due to lack of activity.    Review of Systems  Ten system ROS reviewed and is negative except as above     Allergies:  The patient has no known allergies.     Medications:  Klonopin   Epinephrine  Oxycodone    Past Medical History:     Anxiety     Past Surgical History:    D & C  [place] teeth removal      Social History:  The patient is      Physical Exam     Patient Vitals for the past 24 hrs:   BP Temp Temp src Pulse Resp SpO2 Height Weight   05/30/22 2323 120/73 98.3  F (36.8  C) Temporal 75 14 98 % 1.626 m (5' 4\") 86.2 kg (190 lb)       Physical Exam  Eyes:  Sclera white; Pupils are equal and round; EOMI without pain or entrapment  ENT:    External ears and nares normal  CV:  Rate as above with regular rhythm   Resp:  Breath sounds clear and equal bilaterally    Non-labored, no retractions or accessory muscle use  GI:  Abdomen is soft, non-tender, non-distended    No rebound tenderness or peritoneal features  MS:  Moves all extremities  Skin:  Warm and dry  Neuro:  Speech is normal and fluent. No apparent deficit.  No " meningismus    Emergency Department Course     ECG  No results found for this or any previous visit.    Imaging:  No orders to display     Report per radiology    Laboratory:    Labs Ordered and Resulted from Time of ED Arrival to Time of ED Departure   BASIC METABOLIC PANEL - Normal       Result Value    Sodium 138      Potassium 4.2      Chloride 106      Carbon Dioxide (CO2) 27      Anion Gap 5      Urea Nitrogen 15      Creatinine 0.73      Calcium 8.7      Glucose 98      GFR Estimate >90     CBC WITH PLATELETS AND DIFFERENTIAL     Emergency Department Course:    Reviewed:  I reviewed nursing notes, vitals, past medical history, Care Everywhere and [facility]    Interventions:  0058 Benadryl 25 mg IV  0101 Reglan 5 mg IV  0104 Decadron 10 mg IV     Disposition:  The patient was discharged to home.     Impression & Plan     Medical Decision Making:  Meningitis, pseudotumor, subarachnoid hemorrhage, CNS tumor, venous thrombosis and stroke are considered as part of the differential, and considered unlikely based on H&P. There has been no trauma to increase risk of intracranial bleed. I do not believe imaging is indicated at this time.  May have recurrent presentation of migraine as well as the possibility of subacute sinusitis.  Her pain has resolved with medication interventions.  I recommended she return for worse pain, fever, vomiting, weakness, or any other concerns.  If symptoms do not recur then I feel sinusitis is much less likely.  However if she has some return of symptoms and it would be appropriate to start Augmentin which was prescribed.         Diagnosis:    ICD-10-CM    1. Frontal headache  R51.9    2. Subacute frontal sinusitis  J01.10        Discharge Medications:  Discharge Medication List as of 5/31/2022  2:31 AM      START taking these medications    Details   amoxicillin-clavulanate (AUGMENTIN) 875-125 MG tablet Take 1 tablet by mouth 2 times daily for 7 days For sinusitis, Disp-14 tablet, R-0,  E-Prescribe      metoclopramide (REGLAN) 10 MG tablet Take 1 tablet (10 mg) by mouth 4 times daily as needed (for nausea, vomiting, headache), Disp-10 tablet, R-0, E-Prescribe           Scribe Disclosure:  I, Sebastian Cutler, am serving as a scribe at 1:51 AM on 5/31/2022 to document services personally performed by Gail Goins MD, based on my observations and the provider's statements to me.          Gail Goins MD  05/31/22 0696

## 2022-10-15 ENCOUNTER — HEALTH MAINTENANCE LETTER (OUTPATIENT)
Age: 42
End: 2022-10-15

## 2023-10-18 ENCOUNTER — LAB REQUISITION (OUTPATIENT)
Dept: LAB | Facility: CLINIC | Age: 43
End: 2023-10-18
Payer: COMMERCIAL

## 2023-10-18 PROCEDURE — 88305 TISSUE EXAM BY PATHOLOGIST: CPT | Mod: TC,ORL | Performed by: OBSTETRICS & GYNECOLOGY

## 2023-10-18 PROCEDURE — 88305 TISSUE EXAM BY PATHOLOGIST: CPT | Mod: 26 | Performed by: STUDENT IN AN ORGANIZED HEALTH CARE EDUCATION/TRAINING PROGRAM

## 2023-10-19 LAB
PATH REPORT.COMMENTS IMP SPEC: NORMAL
PATH REPORT.COMMENTS IMP SPEC: NORMAL
PATH REPORT.FINAL DX SPEC: NORMAL
PATH REPORT.GROSS SPEC: NORMAL
PATH REPORT.MICROSCOPIC SPEC OTHER STN: NORMAL
PATH REPORT.RELEVANT HX SPEC: NORMAL
PHOTO IMAGE: NORMAL

## 2023-10-29 ENCOUNTER — HEALTH MAINTENANCE LETTER (OUTPATIENT)
Age: 43
End: 2023-10-29

## 2024-03-17 ENCOUNTER — HEALTH MAINTENANCE LETTER (OUTPATIENT)
Age: 44
End: 2024-03-17

## 2024-12-21 ENCOUNTER — HEALTH MAINTENANCE LETTER (OUTPATIENT)
Age: 44
End: 2024-12-21

## 2025-05-13 ENCOUNTER — OFFICE VISIT (OUTPATIENT)
Dept: CARDIOLOGY | Facility: CLINIC | Age: 45
End: 2025-05-13
Payer: COMMERCIAL

## 2025-05-13 VITALS
SYSTOLIC BLOOD PRESSURE: 116 MMHG | OXYGEN SATURATION: 95 % | BODY MASS INDEX: 32.95 KG/M2 | WEIGHT: 193 LBS | HEIGHT: 64 IN | DIASTOLIC BLOOD PRESSURE: 81 MMHG | HEART RATE: 92 BPM

## 2025-05-13 DIAGNOSIS — R07.9 CHEST PAIN, UNSPECIFIED TYPE: ICD-10-CM

## 2025-05-13 DIAGNOSIS — Z82.49 FAMILY HISTORY OF HEART DISEASE: Primary | ICD-10-CM

## 2025-05-13 PROCEDURE — 93000 ELECTROCARDIOGRAM COMPLETE: CPT | Performed by: INTERNAL MEDICINE

## 2025-05-13 PROCEDURE — G2211 COMPLEX E/M VISIT ADD ON: HCPCS | Performed by: INTERNAL MEDICINE

## 2025-05-13 PROCEDURE — 3074F SYST BP LT 130 MM HG: CPT | Performed by: INTERNAL MEDICINE

## 2025-05-13 PROCEDURE — 99204 OFFICE O/P NEW MOD 45 MIN: CPT | Performed by: INTERNAL MEDICINE

## 2025-05-13 PROCEDURE — 3079F DIAST BP 80-89 MM HG: CPT | Performed by: INTERNAL MEDICINE

## 2025-05-13 NOTE — LETTER
5/13/2025    Ignacio Wen MD  [address] [place] [address]    RE: Calista Nelson       Dear Colleague,     I had the pleasure of seeing Calista YOUNG Omar in the [facility].  CARDIOLOGY CLINIC CONSULTATION    PRIMARY CARE PHYSICIAN:  Ignacio Wen    HISTORY OF PRESENT ILLNESS:  This is a delightful 44-year-old female who is a .  Patient denies any known major medical issues but has a family history of coronary disease in her mother  At 60 years of age but she had a history of smoking and diabetes.  The patient does not smoke.  Does not have diabetes.  No alcohol use.  No sudden death in the family.    The patient has been complaining of intermittent episodes of left-sided chest discomfort that are sporadic and unrelated to activity.  These have been going on for a while now.  No syncope presyncope or failure symptoms or arrhythmic symptoms reported.  Her symptoms sometimes happen with her menstrual cycles.  She is seeking preventative cardiovascular consultation.    PAST MEDICAL HISTORY:  Past Medical History:   Diagnosis Date     Anxiety        MEDICATIONS:  Current Outpatient Medications   Medication Sig Dispense Refill     ClonazePAM (KLONOPIN PO) Take 1 mg by mouth daily        EPINEPHrine (EPIPEN JR) 0.15 MG/0.3ML injection Inject 0.15 mg into the muscle as needed for anaphylaxis       metoclopramide (REGLAN) 10 MG tablet Take 1 tablet (10 mg) by mouth 4 times daily as needed (for nausea, vomiting, headache) 10 tablet 0     oxyCODONE (ROXICODONE) 5 MG IR tablet Take 1-2 tablets (5-10 mg) by mouth every 3 hours as needed for pain or other (Moderate to Severe) 15 tablet 0     No current facility-administered medications for this visit.       SOCIAL HISTORY:  I have reviewed this patient's social history and updated it with pertinent information if needed. Calista Nelson  reports that she has never smoked. She does not have any smokeless tobacco  history on file. She reports that she does not drink alcohol and does not use drugs.    PHYSICAL EXAM:  Pulse:  [92] 92  BP: (116)/(81) 116/81  SpO2:  [95 %] 95 %  193 lbs 0 oz    Constitutional: alert, no distress  Respiratory: Good bilateral air entry  Cardiovascular: Normal regular heart sounds without murmur rubs or gallops no edema  GI: nondistended  Neuropsychiatric: appropriate affact    ASSESSMENT: Pertinent issues addressed/ reviewed during this cardiology visit  Atypical chest pain  Obesity  Family history of coronary disease  Preventative cardiology visit    RECOMMENDATIONS:  ECG today shows normal sinus rhythm.  She has atypical symptoms.  Overall low risk factor profile but has family history of cardiovascular disease in her mother in the 60s.  She is interested in preventative care as well.  First I recommend getting basic labs including CBC CMP TSH and lipid panel.  I recommend getting a exercise stress echocardiogram for further evaluation.  If normal I would also recommend getting a CT coronary calcium score as well.    Irrespective of the test findings, healthy diet exercise weight loss strongly recommended for long-term cardiovascular risk improvement.    It was a pleasure seeing this patient in clinic today. Please do not hesitate to contact me with any future questions.     MADAN Rodgers, [facility]  Cardiology - [facility]  May 13, 2025    Review of the result(s) of each unique test - Last ECG CBC BMP from 2022     The level of medical decision making during this visit was of moderate complexity. The longitudinal plan of care for the diagnosis(es)/condition(s) as documented were addressed during this visit. Due to the added complexity in care, I will continue to support Calista in the subsequent management and with ongoing continuity of care.  Alert me with stress echocardiogram and calcium scoring and lab test if abnormal.    This note was completed in part using dictation via the Dragon voice  recognition software. Some word and grammatical errors may occur and must be interpreted in the appropriate clinical context.  If there are any questions pertaining to this issue, please contact me for further clarification.    Thank you for allowing me to participate in the care of your patient.      Sincerely,     Jaylyn Yen MD     [facility]  cc:   Referred Self, MD  No address on file

## 2025-05-13 NOTE — PROGRESS NOTES
CARDIOLOGY CLINIC CONSULTATION    PRIMARY CARE PHYSICIAN:  Ignacio Wen    HISTORY OF PRESENT ILLNESS:  This is a delightful 44-year-old female who is a .  Patient denies any known major medical issues but has a family history of coronary disease in her mother  At 60 years of age but she had a history of smoking and diabetes.  The patient does not smoke.  Does not have diabetes.  No alcohol use.  No sudden death in the family.    The patient has been complaining of intermittent episodes of left-sided chest discomfort that are sporadic and unrelated to activity.  These have been going on for a while now.  No syncope presyncope or failure symptoms or arrhythmic symptoms reported.  Her symptoms sometimes happen with her menstrual cycles.  She is seeking preventative cardiovascular consultation.    PAST MEDICAL HISTORY:  Past Medical History:   Diagnosis Date    Anxiety        MEDICATIONS:  Current Outpatient Medications   Medication Sig Dispense Refill    ClonazePAM (KLONOPIN PO) Take 1 mg by mouth daily       EPINEPHrine (EPIPEN JR) 0.15 MG/0.3ML injection Inject 0.15 mg into the muscle as needed for anaphylaxis      metoclopramide (REGLAN) 10 MG tablet Take 1 tablet (10 mg) by mouth 4 times daily as needed (for nausea, vomiting, headache) 10 tablet 0    oxyCODONE (ROXICODONE) 5 MG IR tablet Take 1-2 tablets (5-10 mg) by mouth every 3 hours as needed for pain or other (Moderate to Severe) 15 tablet 0     No current facility-administered medications for this visit.       SOCIAL HISTORY:  I have reviewed this patient's social history and updated it with pertinent information if needed. Calista Nelson  reports that she has never smoked. She does not have any smokeless tobacco history on file. She reports that she does not drink alcohol and does not use drugs.    PHYSICAL EXAM:  Pulse:  [92] 92  BP: (116)/(81) 116/81  SpO2:  [95 %] 95 %  193 lbs 0 oz    Constitutional: alert, no  distress  Respiratory: Good bilateral air entry  Cardiovascular: Normal regular heart sounds without murmur rubs or gallops no edema  GI: nondistended  Neuropsychiatric: appropriate affact    ASSESSMENT: Pertinent issues addressed/ reviewed during this cardiology visit  Atypical chest pain  Obesity  Family history of coronary disease  Preventative cardiology visit    RECOMMENDATIONS:  ECG today shows normal sinus rhythm.  She has atypical symptoms.  Overall low risk factor profile but has family history of cardiovascular disease in her mother in the 60s.  She is interested in preventative care as well.  First I recommend getting basic labs including CBC CMP TSH and lipid panel.  I recommend getting a exercise stress echocardiogram for further evaluation.  If normal I would also recommend getting a CT coronary calcium score as well.    Irrespective of the test findings, healthy diet exercise weight loss strongly recommended for long-term cardiovascular risk improvement.    It was a pleasure seeing this patient in clinic today. Please do not hesitate to contact me with any future questions.     MADAN Rodgers, [facility]  Cardiology - [facility]  May 13, 2025    Review of the result(s) of each unique test - Last ECG CBC BMP from 2022     The level of medical decision making during this visit was of moderate complexity. The longitudinal plan of care for the diagnosis(es)/condition(s) as documented were addressed during this visit. Due to the added complexity in care, I will continue to support Calista in the subsequent management and with ongoing continuity of care.  Alert me with stress echocardiogram and calcium scoring and lab test if abnormal.    This note was completed in part using dictation via the Dragon voice recognition software. Some word and grammatical errors may occur and must be interpreted in the appropriate clinical context.  If there are any questions pertaining to this issue, please contact me for  further clarification.

## 2025-07-03 ENCOUNTER — LAB (OUTPATIENT)
Dept: LAB | Facility: CLINIC | Age: 45
End: 2025-07-03
Payer: COMMERCIAL

## 2025-07-03 ENCOUNTER — CARE COORDINATION (OUTPATIENT)
Dept: CARDIOLOGY | Facility: CLINIC | Age: 45
End: 2025-07-03

## 2025-07-03 DIAGNOSIS — Z82.49 FAMILY HISTORY OF HEART DISEASE: ICD-10-CM

## 2025-07-03 DIAGNOSIS — R73.09 ELEVATED GLUCOSE: Primary | ICD-10-CM

## 2025-07-03 DIAGNOSIS — R07.9 CHEST PAIN, UNSPECIFIED TYPE: ICD-10-CM

## 2025-07-03 LAB
ALBUMIN SERPL BCG-MCNC: 4.3 G/DL (ref 3.5–5.2)
ALP SERPL-CCNC: 64 U/L (ref 40–150)
ALT SERPL W P-5'-P-CCNC: 9 U/L (ref 0–50)
ANION GAP SERPL CALCULATED.3IONS-SCNC: 13 MMOL/L (ref 7–15)
APO A-I SERPL-MCNC: 23 MG/DL
AST SERPL W P-5'-P-CCNC: 18 U/L (ref 0–45)
BILIRUB SERPL-MCNC: 1.1 MG/DL
BUN SERPL-MCNC: 11.7 MG/DL (ref 6–20)
CALCIUM SERPL-MCNC: 9.3 MG/DL (ref 8.8–10.4)
CHLORIDE SERPL-SCNC: 103 MMOL/L (ref 98–107)
CHOLEST SERPL-MCNC: 224 MG/DL
CREAT SERPL-MCNC: 0.74 MG/DL (ref 0.51–0.95)
EGFRCR SERPLBLD CKD-EPI 2021: >90 ML/MIN/1.73M2
ERYTHROCYTE [DISTWIDTH] IN BLOOD BY AUTOMATED COUNT: 12.4 % (ref 10–15)
FASTING STATUS PATIENT QL REPORTED: YES
GLUCOSE SERPL-MCNC: 108 MG/DL (ref 70–99)
HCO3 SERPL-SCNC: 23 MMOL/L (ref 22–29)
HCT VFR BLD AUTO: 44.1 % (ref 35–47)
HDLC SERPL-MCNC: 45 MG/DL
HGB BLD-MCNC: 15.2 G/DL (ref 11.7–15.7)
LDLC SERPL CALC-MCNC: 150 MG/DL
MCH RBC QN AUTO: 29.7 PG (ref 26.5–33)
MCHC RBC AUTO-ENTMCNC: 34.5 G/DL (ref 31.5–36.5)
MCV RBC AUTO: 86 FL (ref 78–100)
NONHDLC SERPL-MCNC: 179 MG/DL
PLATELET # BLD AUTO: 253 10E3/UL (ref 150–450)
POTASSIUM SERPL-SCNC: 4 MMOL/L (ref 3.4–5.3)
PROT SERPL-MCNC: 7.4 G/DL (ref 6.4–8.3)
RBC # BLD AUTO: 5.11 10E6/UL (ref 3.8–5.2)
SODIUM SERPL-SCNC: 139 MMOL/L (ref 135–145)
TRIGL SERPL-MCNC: 146 MG/DL
TSH SERPL DL<=0.005 MIU/L-ACNC: 1.87 UIU/ML (ref 0.3–4.2)
WBC # BLD AUTO: 6.7 10E3/UL (ref 4–11)

## 2025-07-03 NOTE — PROGRESS NOTES
Labs and stress echo results noted. Pt had 5/13 visit with  for chest pain and family hx of heart disease. Pt has a CT calcium scan set up for 7/30. Will send  an update on results. Fidelia MAYA July 3, 2025, 4:44 PM    Component      Latest Ref Rng 7/3/2025  9:35 AM   Sodium      135 - 145 mmol/L 139    Potassium      3.4 - 5.3 mmol/L 4.0    Carbon Dioxide (CO2)      22 - 29 mmol/L 23    Anion Gap      7 - 15 mmol/L 13    Urea Nitrogen      6.0 - 20.0 mg/dL 11.7    Creatinine      0.51 - 0.95 mg/dL 0.74    GFR Estimate      >60 mL/min/1.73m2 >90    Calcium      8.8 - 10.4 mg/dL 9.3    Chloride      98 - 107 mmol/L 103    Glucose      70 - 99 mg/dL 108 (H)    Alkaline Phosphatase      40 - 150 U/L 64    AST      0 - 45 U/L 18    ALT      0 - 50 U/L 9    Protein Total      6.4 - 8.3 g/dL 7.4    Albumin      3.5 - 5.2 g/dL 4.3    Bilirubin Total      <=1.2 mg/dL 1.1    WBC      4.0 - 11.0 10e3/uL 6.7    RBC Count      3.80 - 5.20 10e6/uL 5.11    Hemoglobin      11.7 - 15.7 g/dL 15.2    Hematocrit      35.0 - 47.0 % 44.1    MCV      78 - 100 fL 86    MCH      26.5 - 33.0 pg 29.7    MCHC      31.5 - 36.5 g/dL 34.5    RDW      10.0 - 15.0 % 12.4    Platelet Count      150 - 450 10e3/uL 253    Cholesterol      <200 mg/dL 224 (H)    Triglycerides      <150 mg/dL 146    HDL Cholesterol      >=50 mg/dL 45 (L)    LDL Cholesterol Calculated      <100 mg/dL 150 (H)    Non HDL Cholesterol      <130 mg/dL 179 (H)    Patient Fasting? Yes    TSH      0.30 - 4.20 uIU/mL 1.87    Lipoprotein (a)      <30 mg/dL 23       Legend:  (H) High  (L) Low    HR: 100  BP: 110/72 mmHg  ______________________________________________________________________________  Procedure  Stress Echo Treadmill with two dimensional, color and spectral Doppler.  Optison contrast given intravenously [NDC #[id number]].  ______________________________________________________________________________  Interpretation Summary  This was a normal stress  echocardiogram with no evidence of stress-induced  ischemia. The stress iamges were obtained at a HR significantly below the  target HR ( 80 and 116 vs 145 bpm). This significantly decreases senstivity of  the test to detect ischemia. SAX, 3Ch and PLAX were obtained at HR of 80-85  bpm. A4ch at 116 bpm.  This was a normal stress EKG with no evidence of stress-induced ischemia.  There was no chest pain or significant ST changes with exercise.  In apical views, RV borderline dialted and forms the apex of the heart along  with LV. Normal function.  No hemodynamically significant valvular abnormalities on 2D or color flow  imaging.

## 2025-07-04 NOTE — TELEPHONE ENCOUNTER
Thankfully alert.  Please add hemoglobin A1c levels to her test given her glucose being elevated.  Please alert PCP if that is abnormal.  Please alert me with calcium score results.

## 2025-07-07 LAB
EST. AVERAGE GLUCOSE BLD GHB EST-MCNC: 117 MG/DL
HBA1C MFR BLD: 5.7 %

## 2025-07-09 NOTE — PROGRESS NOTES
Component      Latest Ref Rng 7/3/2025  9:35 AM   Estimated Average Glucose      <117 mg/dL 117 (H)    Hemoglobin A1C      <5.7 % 5.7 (H)       Legend:  (H) High

## 2025-07-30 ENCOUNTER — HOSPITAL ENCOUNTER (OUTPATIENT)
Dept: CARDIOLOGY | Facility: CLINIC | Age: 45
Discharge: HOME | End: 2025-07-30
Attending: INTERNAL MEDICINE
Payer: COMMERCIAL

## 2025-07-30 DIAGNOSIS — Z82.49 FAMILY HISTORY OF HEART DISEASE: ICD-10-CM

## 2025-07-30 DIAGNOSIS — R07.9 CHEST PAIN, UNSPECIFIED TYPE: ICD-10-CM

## 2025-07-30 PROCEDURE — 75571 CT HRT W/O DYE W/CA TEST: CPT

## 2025-07-30 PROCEDURE — 75571 CT HRT W/O DYE W/CA TEST: CPT | Mod: 26 | Performed by: INTERNAL MEDICINE

## (undated) DEVICE — PACK TVT HYSTEROSCOPY SMA15HYFSE

## (undated) DEVICE — SUCTION CANISTER MEDIVAC LINER 3000ML W/LID 65651-530

## (undated) DEVICE — KIT HYSTEROSCOPIC 7209827

## (undated) DEVICE — LINEN TOWEL PACK X5 5464

## (undated) DEVICE — SUCTION CANISTER BEMIS HI FLOW 006772-901

## (undated) DEVICE — GLOVE PROTEXIS W/NEU-THERA 6.5  2D73TE65

## (undated) DEVICE — SOL NACL 0.9% IRRIG 1000ML BOTTLE 07138-09

## (undated) DEVICE — SOL NACL 0.9% IRRIG 3000ML BAG 2B7477

## (undated) DEVICE — BLADE MORCELLATOR TRUCLEAR INSICOR PLUS 2.9 72202536

## (undated) DEVICE — DRAPE GYN/UROLOGY FLUID POUCH TUR 29455

## (undated) RX ORDER — CEFAZOLIN SODIUM 2 G/100ML
INJECTION, SOLUTION INTRAVENOUS
Status: DISPENSED
Start: 2017-01-27

## (undated) RX ORDER — DEXAMETHASONE SODIUM PHOSPHATE 4 MG/ML
INJECTION, SOLUTION INTRA-ARTICULAR; INTRALESIONAL; INTRAMUSCULAR; INTRAVENOUS; SOFT TISSUE
Status: DISPENSED
Start: 2017-01-27

## (undated) RX ORDER — KETOROLAC TROMETHAMINE 30 MG/ML
INJECTION, SOLUTION INTRAMUSCULAR; INTRAVENOUS
Status: DISPENSED
Start: 2017-01-27

## (undated) RX ORDER — FENTANYL CITRATE 50 UG/ML
INJECTION, SOLUTION INTRAMUSCULAR; INTRAVENOUS
Status: DISPENSED
Start: 2017-01-27